# Patient Record
Sex: MALE | Race: WHITE
[De-identification: names, ages, dates, MRNs, and addresses within clinical notes are randomized per-mention and may not be internally consistent; named-entity substitution may affect disease eponyms.]

---

## 2017-01-10 ENCOUNTER — HOSPITAL ENCOUNTER (EMERGENCY)
Dept: HOSPITAL 75 - ER | Age: 47
Discharge: HOME | End: 2017-01-10
Payer: SELF-PAY

## 2017-01-10 VITALS — SYSTOLIC BLOOD PRESSURE: 116 MMHG | DIASTOLIC BLOOD PRESSURE: 40 MMHG

## 2017-01-10 VITALS — WEIGHT: 210 LBS | BODY MASS INDEX: 29.4 KG/M2 | HEIGHT: 71 IN

## 2017-01-10 DIAGNOSIS — I10: ICD-10-CM

## 2017-01-10 DIAGNOSIS — R07.9: Primary | ICD-10-CM

## 2017-01-10 DIAGNOSIS — Z79.899: ICD-10-CM

## 2017-01-10 DIAGNOSIS — M79.602: ICD-10-CM

## 2017-01-10 DIAGNOSIS — Z79.82: ICD-10-CM

## 2017-01-10 LAB
ALBUMIN SERPL-MCNC: 4.4 G/DL (ref 3.2–4.5)
ALT SERPL-CCNC: 25 U/L (ref 0–55)
AMYLASE SERPL-CCNC: 27 U/L (ref 25–125)
ANION GAP SERPL CALC-SCNC: 7 MMOL/L (ref 5–14)
APTT BLD: 26 SEC (ref 24–35)
AST SERPL-CCNC: 20 U/L (ref 5–34)
BASOPHILS # BLD AUTO: 0.1 10^3/UL (ref 0–0.1)
BASOPHILS NFR BLD AUTO: 1 % (ref 0–10)
BILIRUB SERPL-MCNC: 0.4 MG/DL (ref 0.1–1)
BUN SERPL-MCNC: 8 MG/DL (ref 7–18)
BUN/CREAT SERPL: 8
CALCIUM SERPL-MCNC: 8.8 MG/DL (ref 8.5–10.1)
CHLORIDE SERPL-SCNC: 108 MMOL/L (ref 98–107)
CO2 SERPL-SCNC: 26 MMOL/L (ref 21–32)
CREAT SERPL-MCNC: 1.04 MG/DL (ref 0.6–1.3)
EOSINOPHIL # BLD AUTO: 0.2 10^3/UL (ref 0–0.3)
EOSINOPHIL NFR BLD AUTO: 2 % (ref 0–10)
ERYTHROCYTE [DISTWIDTH] IN BLOOD BY AUTOMATED COUNT: 14.3 % (ref 10–14.5)
GFR SERPLBLD BASED ON 1.73 SQ M-ARVRAT: > 60 ML/MIN
GLUCOSE SERPL-MCNC: 117 MG/DL (ref 70–105)
INR PPP: 1.1 (ref 0.8–1.4)
LIPASE SERPL-CCNC: 12 U/L (ref 8–78)
LYMPHOCYTES # BLD AUTO: 3.6 X 10^3 (ref 1–4)
LYMPHOCYTES NFR BLD AUTO: 40 % (ref 12–44)
MAGNESIUM SERPL-MCNC: 1.9 MG/DL (ref 1.8–2.4)
MCH RBC QN AUTO: 30 PG (ref 25–34)
MCHC RBC AUTO-ENTMCNC: 34 G/DL (ref 32–36)
MCV RBC AUTO: 87 FL (ref 80–99)
MONOCYTES # BLD AUTO: 1.1 X 10^3 (ref 0–1)
MONOCYTES NFR BLD AUTO: 12 % (ref 0–12)
MYOGLOBIN SERPL-MCNC: 59.9 NG/ML (ref 10–92)
NEUTROPHILS # BLD AUTO: 4.2 X 10^3 (ref 1.8–7.8)
NEUTROPHILS NFR BLD AUTO: 46 % (ref 42–75)
PLATELET # BLD: 412 10^3/UL (ref 130–400)
PMV BLD AUTO: 9.5 FL (ref 7.4–10.4)
POTASSIUM SERPL-SCNC: 3.7 MMOL/L (ref 3.6–5)
PROT SERPL-MCNC: 7.2 G/DL (ref 6.4–8.2)
PROTHROMBIN TIME: 13.8 SEC (ref 12.2–14.7)
RBC # BLD AUTO: 4.33 10^6/UL (ref 4.35–5.85)
SODIUM SERPL-SCNC: 141 MMOL/L (ref 135–145)
WBC # BLD AUTO: 9.1 10^3/UL (ref 4.3–11)

## 2017-01-10 PROCEDURE — 93041 RHYTHM ECG TRACING: CPT

## 2017-01-10 PROCEDURE — 83690 ASSAY OF LIPASE: CPT

## 2017-01-10 PROCEDURE — 93005 ELECTROCARDIOGRAM TRACING: CPT

## 2017-01-10 PROCEDURE — 85610 PROTHROMBIN TIME: CPT

## 2017-01-10 PROCEDURE — 84484 ASSAY OF TROPONIN QUANT: CPT

## 2017-01-10 PROCEDURE — 85025 COMPLETE CBC W/AUTO DIFF WBC: CPT

## 2017-01-10 PROCEDURE — 82150 ASSAY OF AMYLASE: CPT

## 2017-01-10 PROCEDURE — 83874 ASSAY OF MYOGLOBIN: CPT

## 2017-01-10 PROCEDURE — 36415 COLL VENOUS BLD VENIPUNCTURE: CPT

## 2017-01-10 PROCEDURE — 85379 FIBRIN DEGRADATION QUANT: CPT

## 2017-01-10 PROCEDURE — 71010: CPT

## 2017-01-10 PROCEDURE — 85730 THROMBOPLASTIN TIME PARTIAL: CPT

## 2017-01-10 PROCEDURE — 80053 COMPREHEN METABOLIC PANEL: CPT

## 2017-01-10 PROCEDURE — 83735 ASSAY OF MAGNESIUM: CPT

## 2017-01-10 NOTE — DIAGNOSTIC IMAGING REPORT
INDICATION: Left-sided weakness, facial droop.



FINDINGS: The lungs are clear. The heart and vessels are normal.

No effusion or pneumothorax. There has been no change from study

of 12/31/2015.



IMPRESSION: Unremarkable frontal chest.



Dictated by:



Dictated on workstation # [id number]

## 2017-01-10 NOTE — XMS REPORT
Continuity of Care Document

 Created on: 01/10/2017



Bala Ellington

External Reference #: [id number]

: 1970

Sex: Male



Demographics







 Address  [address] [place]  [address]

 

 Home Phone  [phone]

 

 Preferred Language  English

 

 Marital Status  

 

 Confucianism Affiliation  BAP

 

 Race  White or 

 

 Ethnic Group  Not  or 





Author







 Author  [facility]

 

 Organization  [facility]

 

 Address  Unknown

 

 Phone  Unavailable







Support







 Name  Relationship  Address  Phone

 

 , Aimee Ellington  [employer]  [address] [place] [address]  [phone]  [phone]







Care Team Providers







 Care Team Member Name  Role  Phone

 

 Unknown, Unknown  PCP  Unavailable







Source Comments

Some departments are not documenting in the electronic medical record.  If you 
do not see the information that you expected, contact Release of Information in 
the Health Information Management department at [phone] for further 
assistance in locating additional records.[facility]



Active Allergies and Adverse Reactions







    



  Allergen   Noted Date   Severity   Reactions   Comments

 

    



  Bactrim   2005   Medium   RASH 

 

    



  Pcn   10/26/2015   Low   UNKNOWN   Told as child was an



      allergy by mother







Current Medications







      



  Prescription   Sig.   Disp.   Refills   Start   End Date   Status



      Date  

 

      



  metFORMIN (GLUCOPHAGE)   Take 1,000 mg by mouth       Active



  500 mg tablet   twice daily.     

 

      



  FLUoxetine(+) (PROZAC) 40   Take 40 mg by mouth at       Active



  mg capsule   bedtime daily.     

 

      



  lisinopril (PRINIVIL;   Take 10 mg by mouth       Active



  ZESTRIL) 10 mg tablet   daily.     

 

      



  aspirin 81 mg chewable   Take 1 Tab by mouth   90 Tab   3   10/27/20    Active



  tablet   daily.     15  

 

      



  oxyCODONE-acetaminophen   Take 1-2 Tabs by mouth   30 Tab   0   10/27/20    
Active



  (PERCOCET; ENDOCET;   every 6 hours as needed     15  



  ROXICET) 5-325 mg tablet   for Pain Earliest Fill     



   Date: 10/27/15     

 

      



  ibuprofen (MOTRIN) 200 mg   Take 800 mg by mouth       Active



  tablet   daily as needed for Pain.     







Active Problems







 



  Problem   Noted Date

 

 



  Left-sided weakness   2016

 

 



  Essential hypertension   10/27/2015

 

 



  Type 2 diabetes mellitus (HCC)   10/27/2015

 

 



  Depression   10/27/2015

 

 



  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
2015













  Overview:



  Factious disorder vs malingering







Resolved Problems







  



  Problem   Noted Date   Resolved Date

 

  



  Tissue plasminogen activator (t-PA) administered at other facility within   10
/   10/27/2015



  24 hours prior to current admission  







Immunizations







  



  Name   Dates Previously Given   Next Due

 

  



  Flu Vaccine   10/27/2015 



  Quadrivalent=>3 Yo  



  (Preservative Free)  







Social History







    



  Tobacco Use   Types   Packs/Day   Years Used   Date

 

    



  Former Smoker   Cigarettes   1   33   10/01/1982 - 2016









   



  Smokeless Tobacco:   Chew  



  Current User   









   



  Alcohol Use   Drinks/Week   oz/Week   Comments

 

   



  No   0 Standard   0.0 



   drinks or  



   equivalent  







Last Filed Vital Signs







  



  Vital Sign   Reading   Time Taken

 

  



  Blood Pressure   124/72   2016  2:10 PM CDT

 

  



  Pulse   64   2016  2:10 PM CDT

 

  



  Temperature   36.4   C (97.5   F)   2016  2:10 PM CDT

 

  



  Respiratory Rate   -   -

 

  



  Height   1.803 m (5' 11")   2016  6:00 AM CDT

 

  



  Weight   91.899 kg (202 lb 9.6 oz)   2016  6:00 AM CDT

 

  



  Body Mass Index   28.27   2016  6:00 AM CDT

 

  



  Oxygen Saturation   96%   2016  2:10 PM CDT







Plan of Care







   



  Health Maintenance   Due Date   Last Done   Comments

 

   



  Physical (Comprehensive)   1976  



  Exam   

 

   



  Pertussis Vaccine   1980  

 

   



  Tetanus Vaccine   1986  

 

   



  Influenza Vaccine   2016   10/27/2015 







Results from Last 3 Months

Not on file

## 2017-01-10 NOTE — ED CHEST PAIN
General


Chief Complaint:  Chest Pain


Stated Complaint:  CP,BLURRED VISION


Source:  patient


Exam Limitations:  no limitations





History of Present Illness


Time seen by provider:  15:36


Initial Comments


Here with onset of left hand pain and left eye pain earlier today and that was 

associated with left sided chest pain.  Nonradiating and mild described as a 

pressure.  Has been on and off for all day.  States pain last about an hour and 

then goes away.  Most recent chest pain lasted an hour and a half.  Denies 

nausea, vomiting, weakness or diaphoresis.


Timing/Duration:  intermittent, 12 hours


Severity/Quality:  moderate, pressure


Location:  central


Radiation:  no radiation


Activities at Onset:  none


Prior CP/Workup:  cardiac cath


ASA po PTA:  No


NTG SL PTA:  No


Associated Symptoms:  No abdominal pain, No back pain, No nausea/vomiting, No 

shortness of breath, No weakness





Allergies and Home Medications


Allergies


Coded Allergies:  


     Penicillins (Verified  Allergy, Mild, 10/27/15)





Home Medications


Aspirin 325 Mg Tablet 325 MG PO DAILY (Reported) 


Dapagliflozin/Metformin HCl 1 Each Tab.bp.24h 1 TAB PO DAILY (Reported) 


Fluoxetine HCl 40 Mg Capsule 40 MG PO DAILY (Reported) 


Lisinopril 10 Mg Tablet 10 MG PO DAILY (Reported) 


Oxycodone HCl/Acetaminophen 1 Each Tablet 1 TAB PO Q4H PRN PRN PAIN (Reported) 


Sitagliptin Phos/Metformin HCl 1 Each Tablet 1 TAB PO HS (Reported) 





Review of Systems


Constitutional:   see HPINo chills, No fever


EENTM:   No Symptoms Reported


Respiratory:   No Symptoms Reported


Cardiovascular:   No Symptoms Reported


Gastrointestinal:   No Symptoms Reported


Genitourinary:   No Symptoms Reported


Musculoskeletal:   see HPI muscle pain


Skin:   no symptoms reported


Psychiatric/Neurological:   No Symptoms Reported


Endocrine:   No Symptoms Reported





All Other Systems Reviewed


Negative Unless Noted:  Yes





Past Medical-Social-Family Hx


Patient Social History


Alcohol Use:  Denies Use


Recreational Drug Use:  No


Smoking Status:  Never a Smoker


Former Smoker/When Quit:  Oct 27, 2015


Recent Foreign Travel:  No


Contact w/Someone Who Travel:  No





Immunizations Up To Date


Tetanus Booster (TDap):  Unknown


PED Vaccines UTD:  No


Date of Pneumonia Vaccine:  2013


Date of Influenza Vaccine:  Oct 27, 2015





Seasonal Allergies


Seasonal Allergies:  No





Surgeries


HX Surgeries:  Yes (Partial pancreatectomy and spleenectomy, right shoulder)


Surgeries:  Appendectomy, Pancreatic





Respiratory


Hx Respiratory Disorders:  Yes


Respiratory Disorders:  Sleep Apnea, COPD





Cardiovascular


Hx Cardiac Disorders:  Yes (Endocarditis in 2006)


Cardiac Disorders:  Endocarditis





Neurological


Hx Neurological Disorders:  Yes (STROKE X3)


Neurological Disorders:  Stroke





Reproductive System


Hx Reproductive Disorders:  No


Sexually Transmitted Disease:  No


HIV/AIDS:  No





Genitourinary


Hx Genitourinary Disorders:  No





Gastrointestinal


Hx Gastrointestinal Disorders:  No


Gastrointestinal Disorders:  Pancreatitis, Gall Bladder Disease





Musculoskeletal


Hx Musculoskeletal Disorders:  Yes


Musculoskeletal Disorders:  Arthritis, Back Injury





Endocrine


Hx Endocrine Disorders:  Yes


Endocrine Disorders:  Diabetes, Non-Insulin dep





HEENT


HX ENT Disorders:  No


Loss of Vision:  Denies


Hearing Impairment:  Denies





Cancer


Hx Cancer:  No





Psychosocial


Hx Psychiatric Problems:  Yes


Behavioral Health Disorders:  Anxiety, Depression





Integumentary


HX Skin/Integumentary Disorder:  No





Blood Transfusions


Hx Blood Disorders:  No


Adverse Reaction to a Blood Tr:  No





Reviewed Nursing Assessment


Reviewed/Agree w Nursing PMH:  Yes





Family Medical History


Significant Family History:  No Pertinent Family Hx, Diabetes, Stroke


Family Medial History:  


Patient reports no known family medical history.





Physical Exam


Vital Signs





 Vital Sign - Last 12Hours








 1/10/17





 15:20


 


Temp 97.9


 


Pulse 78


 


Resp 16


 


B/P 118/84





Capillary Refill :


General Appearance:   No Apparent Distress WD/WN


Neck:   Non Tender Supple


Respiratory:   Lungs Clear Normal Breath Sounds


Cardiovascular:   Regular Rate, Rhythm No Murmur


Gastrointestinal:   Non Tender


Rectal:   Normal Exam


Extremity:   Non Tender No Calf Tenderness


Neurologic/Psychiatric:   Alert Oriented x3


Skin:   Normal Color Warm/Dry





Progress/Results/Core Measures


Results/Orders


Lab Results





 Laboratory Tests








Test


  1/10/17


15:40 Range/Units


 


 


Activated Partial


Thromboplast Time 26 


  24-35  SEC


 


 


Alanine Aminotransferase


(ALT/SGPT) 25 


  0-55  U/L


 


 


Albumin 4.4  3.2-4.5  G/DL


 


Alkaline Phosphatase 60    U/L


 


Amylase Level 27    U/L


 


Anion Gap 7  5-14  MMOL/L


 


Aspartate Amino Transf


(AST/SGOT) 20 


  5-34  U/L


 


 


BUN/Creatinine Ratio 8   


 


Basophils # (Auto)


  0.1 


  0.0-0.1


10^3/uL


 


Basophils (%) (Auto) 1  0-10  %


 


Blood Urea Nitrogen 8  7-18  MG/DL


 


Calcium Level 8.8  8.5-10.1  MG/DL


 


Carbon Dioxide Level 26  21-32  MMOL/L


 


Chloride Level 108 H   MMOL/L


 


Creatinine


  1.04 


  0.60-1.30


MG/DL


 


D-Dimer


  0.27 


  0.00-0.49


UG/ML


 


Eosinophils # (Auto)


  0.2 


  0.0-0.3


10^3/uL


 


Eosinophils (%) (Auto) 2  0-10  %


 


Estimat Glomerular Filtration


Rate > 60 


   


 


 


Glucose Level 117 H   MG/DL


 


Hematocrit 38 L 40-54  %


 


Hemoglobin 13.0 L 13.3-17.7  G/DL


 


INR Comment 1.1  0.8-1.4  


 


Lipase 12  8-78  U/L


 


Lymphocytes # (Auto) 3.6  1.0-4.0  X 10^3


 


Lymphocytes (%) (Auto) 40  12-44  %


 


Magnesium Level 1.9  1.8-2.4  MG/DL


 


Mean Corpuscular Hemoglobin 30  25-34  PG


 


Mean Corpuscular Hemoglobin


Concent 34 


  32-36  G/DL


 


 


Mean Corpuscular Volume 87  80-99  FL


 


Mean Platelet Volume 9.5  7.4-10.4  FL


 


Monocytes # (Auto) 1.1 H 0.0-1.0  X 10^3


 


Monocytes (%) (Auto) 12  0-12  %


 


Myoglobin


  59.9 


  10.0-92.0


NG/ML


 


Neutrophils # (Auto) 4.2  1.8-7.8  X 10^3


 


Neutrophils (%) (Auto) 46  42-75  %


 


Platelet Count


  412 H


  130-400


10^3/uL


 


Potassium Level 3.7  3.6-5.0  MMOL/L


 


Prothrombin Time 13.8  12.2-14.7  SEC


 


Red Blood Count


  4.33 L


  4.35-5.85


10^6/uL


 


Red Cell Distribution Width 14.3  10.0-14.5  %


 


Sodium Level 141  135-145  MMOL/L


 


Total Bilirubin 0.4  0.1-1.0  MG/DL


 


Total Protein 7.2  6.4-8.2  G/DL


 


Troponin I < 0.30  <0.30  NG/ML


 


White Blood Count


  9.1 


  4.3-11.0


10^3/uL








My Orders





 Orders-LEANNA ESCOTO MD


Cbc With Automated Diff (1/10/17 15:31)


Magnesium (1/10/17 15:31)


Chest 1 View, Ap/Pa Only (1/10/17 15:31)


Ekg Tracing (1/10/17 15:31)


Cardiac Profile 1 (1/10/17 15:31)


Comprehensive Metabolic Panel (1/10/17 15:31)


Myoglobin Serum (1/10/17 15:31)


Protime With Inr (1/10/17 15:31)


Partial Thromboplastin Time (1/10/17 15:31)


O2 (1/10/17 15:31)


Monitor-Rhythm Ecg Trace Only (1/10/17 15:31)


Lipid Panel (17 06:00)


Aspirin Chewable Tablet (Baby Aspirin Ch (1/10/17 15:45)


Saline Lock/Iv-Start (1/10/17 15:31)


Lipase (1/10/17 15:31)


Amylase (1/10/17 15:31)


Fibrin Degradation Products (1/10/17 15:31)





Medications Given in ED





 Current Medications








 Medications  Dose


 Ordered  Sig/Meera


 Route  Start Time


 Stop Time Status Last Admin


Dose Admin


 


 Aspirin  324 mg  ONCE  ONCE


 PO  1/10/17 15:45


 1/10/17 15:46 DC 1/10/17 15:49


324 MG








Vital Signs/I&O





 Vital Sign - Last 12Hours








 1/10/17





 15:20


 


Temp 97.9


 


Pulse 78


 


Resp 16


 


B/P 118/84








Progress Note :  


Progress Note


Seen and evaluated.  IV, labs, EKG and chest x-ray.   mg by mouth.  No 

significant chest pain currently so no nitroglycerin.  Monitor patient.  1645: 

Workup is negative for cardiac event.  He had heart catheter 2 years ago that 

showed angiographically normal and large coronary arteries.  Low risk for 

cardiac event.





ECG


Initial ECG Impression Date:  Ellis 10, 2017


Initial ECG Impression Time:  15:29


Initial ECG Rate:  76


Initial ECG Rhythm:  Normal Sinus


Initial ECG Intervals:  Normal


Initial ECG Impression:  Normal


Initial ECG Comparisson:  Unchanged


Comment


Sinus rhythm with normal axis.  No evidence of ST elevation MI.  Similar to 

previous of 16.  Interpreted by me.





Diagnostic Imaging





   Diagonstic Imaging:  Xray


   Plain Films/CT/US/NM/MRI:  chest


Comments


NAME:      TERESA YI


MED REC#:   [id number]


ACCOUNT#:   [id number]


PT STATUS:   REG ER


:      1970


PHYSICIAN:    LEANNA ESCOTO MD


ADMIT DATE:   01/10/17/ER


 ***Signed***


Date of Exam:   01/10/17





CHEST 1 VIEW, AP/PA ONLY


 


INDICATION: Left-sided weakness, facial droop.





FINDINGS: The lungs are clear. The heart and vessels are normal.


No effusion or pneumothorax. There has been no change from study


of 2015.





IMPRESSION: Unremarkable frontal chest.





Dictated by:





Dictated on workstation # [id number]





Dict:   01/10/17 1606


Trans:   01/10/17 1640


  [id number]





Interpreted by:       SARAH REDDING


Electronically signed by:SARAH REDDING   01/10/17 1643


   Reviewed:  Reviewed by Me





Departure


Impression


Impression:  


 Primary Impression:  


 Chest pain


 Qualified Code:  R07.9 - Chest pain, unspecified


Disposition:   HOME, SELF-CARE


Condition:  Improved





Departure-Patient Inst.


Decision time for Depature:  16:52


Referrals:  


BON SLOAN MD





NO,LOCAL PHYSICIAN (PCP)


Primary Care Physician


Patient Instructions:  Chest Pain (DC)





Add. Discharge Instructions:


All discharge instructions reviewed with patient and/or family. Voiced 

understanding.





Take baby aspirin daily.  Follow-up with your DrGab this week for recheck and 

further evaluation.  Thick appointment with Dr. Sloan for recheck and further 

evaluation.  Call his office in the morning for appointment.  Return for 

worsening, fever, vomiting, weakness, breathing problems or other concerns as 

needed.


Work/School Note:  Work Release Form   Date Seen in the Emergency Department:  

Ellis 10, 2017


   Return to Work:  2017


   Restrictions:  No Restrictions








LEANNA ESCOTO MD Ellis 10, 2017 15:47

## 2017-10-21 ENCOUNTER — HOSPITAL ENCOUNTER (OUTPATIENT)
Dept: HOSPITAL 75 - LAB | Age: 47
End: 2017-10-21
Attending: NURSE PRACTITIONER
Payer: SELF-PAY

## 2017-10-21 DIAGNOSIS — I10: Primary | ICD-10-CM

## 2017-10-21 DIAGNOSIS — E83.42: ICD-10-CM

## 2017-10-21 DIAGNOSIS — E11.9: ICD-10-CM

## 2017-10-21 LAB
ALBUMIN SERPL-MCNC: 4.2 GM/DL (ref 3.2–4.5)
ALT SERPL-CCNC: 26 U/L (ref 0–55)
ANION GAP SERPL CALC-SCNC: 7 MMOL/L (ref 5–14)
AST SERPL-CCNC: 19 U/L (ref 5–34)
BILIRUB SERPL-MCNC: 0.4 MG/DL (ref 0.1–1)
BUN SERPL-MCNC: 17 MG/DL (ref 7–18)
BUN/CREAT SERPL: 19
CALCIUM SERPL-MCNC: 9.1 MG/DL (ref 8.5–10.1)
CHLORIDE SERPL-SCNC: 110 MMOL/L (ref 98–107)
CHOLEST SERPL-MCNC: 131 MG/DL (ref ?–200)
CO2 SERPL-SCNC: 26 MMOL/L (ref 21–32)
CREAT SERPL-MCNC: 0.91 MG/DL (ref 0.6–1.3)
ERYTHROCYTE [DISTWIDTH] IN BLOOD BY AUTOMATED COUNT: 14.4 % (ref 10–14.5)
GFR SERPLBLD BASED ON 1.73 SQ M-ARVRAT: > 60 ML/MIN
GLUCOSE SERPL-MCNC: 96 MG/DL (ref 70–105)
LDLC SERPL DIRECT ASSAY-MCNC: 86 MG/DL (ref 1–129)
MAGNESIUM SERPL-MCNC: 2 MG/DL (ref 1.8–2.4)
MCH RBC QN AUTO: 30 PG (ref 25–34)
MCHC RBC AUTO-ENTMCNC: 34 G/DL (ref 32–36)
MCV RBC AUTO: 88 FL (ref 80–99)
PLATELET # BLD: 398 10^3/UL (ref 130–400)
PMV BLD AUTO: 9 FL (ref 7.4–10.4)
POTASSIUM SERPL-SCNC: 4.3 MMOL/L (ref 3.6–5)
PROT SERPL-MCNC: 7.5 GM/DL (ref 6.4–8.2)
RBC # BLD AUTO: 4.48 10^6/UL (ref 4.35–5.85)
SODIUM SERPL-SCNC: 143 MMOL/L (ref 135–145)
TRIGL SERPL-MCNC: 55 MG/DL (ref ?–150)
VLDLC SERPL CALC-MCNC: 11 MG/DL (ref 5–40)
WBC # BLD AUTO: 6.7 10^3/UL (ref 4.3–11)

## 2017-10-21 PROCEDURE — 85027 COMPLETE CBC AUTOMATED: CPT

## 2017-10-21 PROCEDURE — 83735 ASSAY OF MAGNESIUM: CPT

## 2017-10-21 PROCEDURE — 83036 HEMOGLOBIN GLYCOSYLATED A1C: CPT

## 2017-10-21 PROCEDURE — 80061 LIPID PANEL: CPT

## 2017-10-21 PROCEDURE — 80053 COMPREHEN METABOLIC PANEL: CPT

## 2017-10-21 PROCEDURE — 36415 COLL VENOUS BLD VENIPUNCTURE: CPT

## 2020-08-10 ENCOUNTER — HOSPITAL ENCOUNTER (OUTPATIENT)
Dept: HOSPITAL 75 - CARD | Age: 50
End: 2020-08-10
Attending: INTERNAL MEDICINE
Payer: SELF-PAY

## 2020-08-10 DIAGNOSIS — I11.9: Primary | ICD-10-CM

## 2020-08-10 PROCEDURE — 93306 TTE W/DOPPLER COMPLETE: CPT

## 2021-03-09 ENCOUNTER — HOSPITAL ENCOUNTER (OUTPATIENT)
Dept: HOSPITAL 75 - PREOP | Age: 51
LOS: 90 days | Discharge: HOME | End: 2021-06-07
Attending: UROLOGY
Payer: SELF-PAY

## 2021-03-09 VITALS — WEIGHT: 230.38 LBS | HEIGHT: 70.98 IN | BODY MASS INDEX: 32.25 KG/M2

## 2021-03-09 DIAGNOSIS — Z01.818: Primary | ICD-10-CM

## 2021-03-16 ENCOUNTER — HOSPITAL ENCOUNTER (OUTPATIENT)
Dept: HOSPITAL 75 - SDC | Age: 51
Discharge: HOME | End: 2021-03-16
Attending: UROLOGY
Payer: COMMERCIAL

## 2021-03-16 VITALS — DIASTOLIC BLOOD PRESSURE: 83 MMHG | SYSTOLIC BLOOD PRESSURE: 126 MMHG

## 2021-03-16 VITALS — SYSTOLIC BLOOD PRESSURE: 138 MMHG | DIASTOLIC BLOOD PRESSURE: 93 MMHG

## 2021-03-16 VITALS — SYSTOLIC BLOOD PRESSURE: 127 MMHG | DIASTOLIC BLOOD PRESSURE: 75 MMHG

## 2021-03-16 VITALS — SYSTOLIC BLOOD PRESSURE: 125 MMHG | DIASTOLIC BLOOD PRESSURE: 78 MMHG

## 2021-03-16 VITALS — DIASTOLIC BLOOD PRESSURE: 87 MMHG | SYSTOLIC BLOOD PRESSURE: 121 MMHG

## 2021-03-16 VITALS — DIASTOLIC BLOOD PRESSURE: 84 MMHG | SYSTOLIC BLOOD PRESSURE: 117 MMHG

## 2021-03-16 VITALS — DIASTOLIC BLOOD PRESSURE: 82 MMHG | SYSTOLIC BLOOD PRESSURE: 120 MMHG

## 2021-03-16 VITALS — HEIGHT: 70.87 IN | WEIGHT: 230.38 LBS | BODY MASS INDEX: 32.25 KG/M2

## 2021-03-16 VITALS — DIASTOLIC BLOOD PRESSURE: 91 MMHG | SYSTOLIC BLOOD PRESSURE: 124 MMHG

## 2021-03-16 VITALS — SYSTOLIC BLOOD PRESSURE: 123 MMHG | DIASTOLIC BLOOD PRESSURE: 92 MMHG

## 2021-03-16 VITALS — DIASTOLIC BLOOD PRESSURE: 80 MMHG | SYSTOLIC BLOOD PRESSURE: 119 MMHG

## 2021-03-16 DIAGNOSIS — Z79.899: ICD-10-CM

## 2021-03-16 DIAGNOSIS — Z88.1: ICD-10-CM

## 2021-03-16 DIAGNOSIS — Z80.9: ICD-10-CM

## 2021-03-16 DIAGNOSIS — G47.33: ICD-10-CM

## 2021-03-16 DIAGNOSIS — I10: ICD-10-CM

## 2021-03-16 DIAGNOSIS — Z88.2: ICD-10-CM

## 2021-03-16 DIAGNOSIS — E11.9: ICD-10-CM

## 2021-03-16 DIAGNOSIS — J45.909: ICD-10-CM

## 2021-03-16 DIAGNOSIS — G43.909: ICD-10-CM

## 2021-03-16 DIAGNOSIS — N43.3: Primary | ICD-10-CM

## 2021-03-16 DIAGNOSIS — I25.10: ICD-10-CM

## 2021-03-16 PROCEDURE — 82962 GLUCOSE BLOOD TEST: CPT

## 2021-03-16 PROCEDURE — 87081 CULTURE SCREEN ONLY: CPT

## 2021-03-16 RX ADMIN — SODIUM CHLORIDE, SODIUM LACTATE, POTASSIUM CHLORIDE, AND CALCIUM CHLORIDE PRN MLS/HR: 600; 310; 30; 20 INJECTION, SOLUTION INTRAVENOUS at 07:40

## 2021-03-16 RX ADMIN — SODIUM CHLORIDE, SODIUM LACTATE, POTASSIUM CHLORIDE, AND CALCIUM CHLORIDE PRN MLS/HR: 600; 310; 30; 20 INJECTION, SOLUTION INTRAVENOUS at 08:38

## 2021-03-16 NOTE — DISCHARGE INST-UROLOGY
Discharge Inst-Urology


Reconcile Patient Problems


Problems Reviewed?:  Yes


Final Diagnosis


LT HYDROCELE





Patient Instructions/Follow Up


Plan/Assessment/Instructions


Please make appointment to been seen in office in 2 weeks. Rest till then





Come to office tomorrow 9am to DC drain, then start showers no bath





Scrotal support for 2 weeks





Keep bowels soft and moving





Ice to scrotum in RR and at home for 6 hours and then PRN





Increase oral fluids for 48 hours and then as needed.





Diet as tolerated.





If questions or concerns contact your physician or seek help at emergency 

department.











TAWIL,ELIAS A MD               Mar 16, 2021 08:54

## 2021-03-16 NOTE — PROGRESS NOTE-PRE OPERATIVE
Pre-Operative Progress Note


H&P Reviewed


The H&P was reviewed, patient examined and no changes noted.


Date Seen by Provider:  Mar 16, 2021


Time Seen by Provider:  07:03


Date H&P Reviewed:  Mar 16, 2021


Time H&P Reviewed:  07:03


Pre-Operative Diagnosis:  LT HYDROCELE











TAWIL,ELIAS A MD               Mar 16, 2021 07:03

## 2021-03-16 NOTE — OPERATIVE REPORT
DATE OF SERVICE:  03/16/2021



PREOPERATIVE DIAGNOSIS:

Left hydrocele.



POSTOPERATIVE DIAGNOSIS:

Left hydrocele.



OPERATION PERFORMED:

Left hydrocelectomy.



SURGEON:

Elias Tawil, MD



ANESTHESIA:

General.



COMPLICATIONS:

None.



DESCRIPTION OF PROCEDURE:

Under satisfactory general anesthesia, the patient in supine position, abdomen,

genitalia and thigh were prepped and draped in the usual sterile fashion. 

Incision was made in the median raphe, carried through the left scrotal

compartment.  Large amount of fluid was suctioned.  Bleeders were cauterized as

the dissection was proceeding.  The testicle was intact and the epididymis as

well and healthy.  The hydrocele sac was everted behind the spermatic cord and

with four interrupted 4-0 chromic catgut suture.  Hemostasis was complete.  The

testicle was replaced into the left scrotal compartment that was drained with

quarter of an inch Penrose drain, brought through a separate stab wound at the

bottom of the scrotum secured in position with a 3-0 chromic catgut suture. 

Closure was performed in layer, the dartos with running 3-0 chromic catgut and

the skin with interrupted 4-0 Vicryl.  Telfa, fluffs and scrotal support was

applied.  Estimated blood loss was negligible.  Needle, sponge, instrument count

correct x2.  The patient tolerated the procedure and anesthesia well and was

sent to recovery room in stable condition.  Instructions were given to the wife.





Job ID: [id number]

DocumentID: [id number]

Dictated Date:  03/16/2021 08:57:51

Transcription Date: 03/16/2021 11:32:15

Dictated By: ELIAS TAWIL, MD

[facility]

## 2021-03-16 NOTE — PROGRESS NOTE-POST OPERATIVE
Post-Operative Progess Note


Surgeon (s)/Assistant (s)


Surgeon


ELIAS TAWIL MD


Assistant:  NONE





Pre-Operative Diagnosis


LT HYDROCELE





Post-Operative Diagnosis





SAME





Procedure & Operative Findings


Date of Procedure


3/16/21


Procedure Performed/Findings


LT HYDROCELECTOMY


Anesthesia Type


GENERAL





Estimated Blood Loss


Estimated blood loss (mL):  NEGLIGIBLE





Specimens/Packing


Specimens Removed


NONE


Packin/4" PENROSE DRAIN











TAWIL,ELIAS A MD               Mar 16, 2021 08:51